# Patient Record
Sex: MALE | Race: WHITE | NOT HISPANIC OR LATINO | Employment: UNEMPLOYED | URBAN - METROPOLITAN AREA
[De-identification: names, ages, dates, MRNs, and addresses within clinical notes are randomized per-mention and may not be internally consistent; named-entity substitution may affect disease eponyms.]

---

## 2020-07-06 ENCOUNTER — OFFICE VISIT CONVERTED (OUTPATIENT)
Dept: SURGERY | Facility: CLINIC | Age: 55
End: 2020-07-06
Attending: SURGERY

## 2020-08-03 ENCOUNTER — OFFICE VISIT CONVERTED (OUTPATIENT)
Dept: SURGERY | Facility: CLINIC | Age: 55
End: 2020-08-03
Attending: SURGERY

## 2020-09-07 ENCOUNTER — HOSPITAL ENCOUNTER (OUTPATIENT)
Dept: PREADMISSION TESTING | Facility: HOSPITAL | Age: 55
Discharge: HOME | End: 2020-09-07
Attending: SURGERY

## 2020-09-07 LAB — SARS-COV-2 RNA SPEC QL NAA+PROBE: NOT DETECTED

## 2020-09-11 ENCOUNTER — HOSPITAL ENCOUNTER (OUTPATIENT)
Dept: PERIOP | Facility: HOSPITAL | Age: 55
Setting detail: HOSPITAL OUTPATIENT SURGERY
Discharge: HOME | End: 2020-09-11
Attending: SURGERY

## 2020-09-11 LAB
GLUCOSE BLD-MCNC: 189 MG/DL (ref 70–99)
GLUCOSE BLD-MCNC: 254 MG/DL (ref 70–99)

## 2020-09-21 ENCOUNTER — OFFICE VISIT CONVERTED (OUTPATIENT)
Dept: SURGERY | Facility: CLINIC | Age: 55
End: 2020-09-21
Attending: SURGERY

## 2020-09-28 ENCOUNTER — CONVERSION ENCOUNTER (OUTPATIENT)
Dept: SURGERY | Facility: CLINIC | Age: 55
End: 2020-09-28

## 2020-09-28 ENCOUNTER — OFFICE VISIT CONVERTED (OUTPATIENT)
Dept: SURGERY | Facility: CLINIC | Age: 55
End: 2020-09-28
Attending: SURGERY

## 2020-10-13 ENCOUNTER — OFFICE VISIT CONVERTED (OUTPATIENT)
Dept: SURGERY | Facility: CLINIC | Age: 55
End: 2020-10-13
Attending: SURGERY

## 2020-12-14 ENCOUNTER — OFFICE VISIT CONVERTED (OUTPATIENT)
Dept: SURGERY | Facility: CLINIC | Age: 55
End: 2020-12-14
Attending: SURGERY

## 2021-05-10 NOTE — H&P
History and Physical      Patient Name: Winston Nazario   Patient ID: [id number]   Sex: Male   YOB: 1965    Primary Care Provider: Lou FORD   Referring Provider: Lou FORD    Visit Date: July 6, 2020    Provider: Luca Garcia MD   [facility]   Location Address: [address] [place] [address]  [id number]   Location Phone: [phone]          Chief Complaint  · Pilonidal Cyst      History Of Present Illness  Winston Nazario is a 54 year old /White male who presents to the office today as a consult from Lou FORD.      Patient was referred by Lou Owens who is with the [facility] in [place] for ongoing pilonidal issues.  The patient says he had a pilonidal cystectomy by Dr. Chucky Huntley in January 2020 but shortly after that the pilonidal cyst recurred and the patient is adamant that he not see Dr. Huntley again.  Patient has an open wound in the midline of his buttocks and he has had problems on and off with open wounds and drainage from this area for many years.  Patient does not smoke cigarettes.       Past Medical History  Disease Name Date Onset Notes   Arthritis --  --    Bowel Disease --  --    Chronic Obstructive Pulmonary Disease --  --    Diabetes --  --    High blood pressure --  --    High cholesterol --  --    Kidney stones --  --    Shortness Of Air --  --          Past Surgical History  Procedure Name Date Notes   Appendectomy --  --          Medication List  Name Date Started Instructions   atorvastatin 20 mg oral tablet  --    lisinopril 5 mg oral tablet  --    montelukast 10 mg oral tablet  take 1 tablet (10 mg) by oral route once daily in the evening   naproxen 500 mg oral tablet  --    Trelegy Ellipta 100-62.5-25 mcg inhalation blister with device  inhale 1 puff by inhalation route once daily at the same time each day         Allergy List  Allergen Name Date Reaction Notes   NO KNOWN DRUG  "ALLERGIES --  --  --        Allergies Reconciled  Social History  Finding Status Start/Stop Quantity Notes   Tobacco Never --/-- --  --          Review of Systems  · Constitutional  o Denies  o : chills, fever  · Gastrointestinal  o Denies  o : nausea, vomiting      Vitals  Date Time BP Position Site L\R Cuff Size HR RR TEMP (F) WT  HT  BMI kg/m2 BSA m2 O2 Sat HC       07/06/2020 01:40 PM       12  281lbs 8oz 5'  10\" 40.39 2.51           Physical Examination  · Constitutional  o Appearance  o : here alone, alert and in no acute distress, reliable historian  · Head and Face  o Head  o :   § Inspection  § : no visable deformities or lesions  · Eyes  o Conjunctivae  o : clear  o Sclerae  o : clear  · Neck  o Inspection/Palpation  o : normal appearance, no masses, trachea midline  · Respiratory  o Respiratory Effort  o : breathing unlabored, respiratory effort appears normal  o Inspection of Chest  o : normal appearance, no retractions  · Cardiovascular  o Heart  o : regular rate and rhythm  · Gastrointestinal  o Abdominal Examination  o :   § Abdomen  § : soft, nontender, nondistended  · Skin and Subcutaneous Tissue  o General Inspection  o : There is significant scar tissue in the midline gluteal cleft. There is an open wound about 3 cm long by 1.5 cm wide with beefy red tissue at the base. There are several skin pits in the midline gluteal cleft. No drainage today. No erythema. No fluctuance.  · Neurologic  o Cranial Nerves  o : no obvious motor deficits  o Sensation  o : no obvious sensory deficits  o Gait and Station  o :   § Gait Screening  § : normal gait, able to stand without diffculty  o Cerebellar Function  o : no obvious abnormalities  · Psychiatric  o Judgement and Insight  o : judgment and insight intact  o Mood and Affect  o : mood normal, affect appropriate          Assessment  · Pilonidal disease     709.8/L98.8    Problems Reconciled  Plan  · Medications  o Medications have been " Reconciled  o Transition of Care or Provider Policy  · Instructions  o Electronically Identified Patient Education Materials Provided Electronically     We discussed surgical option for the patient's significant pilonidal disease.  My recommendation is a cleft lift procedure.  I specifically perform a [place] cleft lift procedure.  I told the patient to review the procedure on the Internet and then schedule an appointment to see me sometime in August and we will make plans for a surgery date.             Electronically Signed by: Luca Garcia MD -Author on July 6, 2020 02:00:20 PM

## 2021-05-13 NOTE — PROGRESS NOTES
"   Progress Note      Patient Name: Winston Nazario   Patient ID: [id number]   Sex: Male   YOB: 1965    Primary Care Provider: Lou FORD   Referring Provider: Lou FORD    Visit Date: September 21, 2020    Provider: Luca Garcia MD   Location: [facility]   Location Address: [address] [place] [address]  [id number]   Location Phone: [phone]          Chief Complaint  · Follow up Surgery      History Of Present Illness  Winston Nazario is a 55 year old /White male who presents today for a postoperative visit.      Patient is here for follow-up after excision of pilonidal disease with a cleft lift procedure performed 10 days ago.  He has no complaints and is doing well.  Drain has had minimal output and the drain was removed today without difficulty.  Fortunately, the wound is healing very well thus far.  No new issues to address.  I will have the patient see me in one week so I can remove the sutures.       Vitals  Date Time BP Position Site L\R Cuff Size HR RR TEMP (F) WT  HT  BMI kg/m2 BSA m2 O2 Sat HC       09/21/2020 08:38 AM       14  283lbs 0oz 5'  10\" 40.61 2.52               Assessment  · Postoperative Exam Following Surgery     V67.00      Plan  · Medications  o Medications have been Reconciled  o Transition of Care or Provider Policy  · Instructions  o See Above HPI section.  o Electronically Identified Patient Education Materials Provided Electronically            Electronically Signed by: Luca Garcia MD -Author on September 21, 2020 08:55:51 AM  "

## 2021-05-13 NOTE — PROGRESS NOTES
"   Progress Note      Patient Name: Winston Nazario   Patient ID: [id number]   Sex: Male   YOB: 1965    Primary Care Provider: Lou FORD   Referring Provider: Lou FORD    Visit Date: September 28, 2020    Provider: Luca Garcia MD   Location: [facility]   Location Address: [address] [place] [address]  [id number]   Location Phone: [phone]          Chief Complaint  · Follow up Surgery      History Of Present Illness  Winston Nazario is a 55 year old /White male who presents today for a postoperative visit.      Patient is here for another follow-up appointment after excision of pilonidal disease with a cleft lift procedure.  He continues to do well.  On exam, the incision is still healing without any evidence of infection.  The area where the drain was located has scabbed over.  There is some mild swelling along the right side of the incision consistent with a small seroma but nothing large enough that requires drainage at this time.  I removed all of the visible Prolene suture but patient has stool around his anus and because of this I am unable to see the lower aspect of the incision very well to tell if I have removed the sutures at the lower aspect.  Nevertheless, I have the patient see me again in 2 weeks for another evaluation.  I told him he needs to get in the shower 2 times a day and clean over the wound with antibacterial soap during each time he showers.       Vitals  Date Time BP Position Site L\R Cuff Size HR RR TEMP (F) WT  HT  BMI kg/m2 BSA m2 O2 Sat HC       09/28/2020 01:07 PM       14  274lbs 6oz 5'  10\" 39.37 2.48               Assessment  · Postoperative Exam Following Surgery     V67.00      Plan  · Medications  o Medications have been Reconciled  o Transition of Care or Provider Policy  · Instructions  o See Above HPI section.  o Electronically Identified Patient Education Materials Provided " Electronically            Electronically Signed by: Luca Garcia MD -Author on September 28, 2020 01:25:33 PM

## 2021-05-13 NOTE — PROGRESS NOTES
"   Progress Note      Patient Name: Winston Nazario   Patient ID: [id number]   Sex: Male   YOB: 1965    Primary Care Provider: Lou FORD   Referring Provider: Lou FORD    Visit Date: December 14, 2020    Provider: Luca Garcia MD   Location: [facility]   Location Address: [address] [place] [address]  [id number]   Location Phone: [phone]          Chief Complaint  · Follow up Surgery      History Of Present Illness  Winston Nazario is a 55 year old /White male who presents today for a postoperative visit.      Patient is here for another follow-up appointment after excision of his pilonidal disease.  He continues to do great.  No concerns reported by the patient.  The surgical site has completely healed and looks fine.  My assessment is the patient has fully recovered after his surgery.  There are no new issues to address and the patient can see me PRN going forward.       Past Medical History  Arthritis; Bowel Disease; Chronic Obstructive Pulmonary Disease; Diabetes; High blood pressure; High cholesterol; Kidney stones; Shortness Of Air         Past Surgical History  Appendectomy; Pilonidal Cyst Excision         Medication List  atorvastatin 20 mg oral tablet; lisinopril 5 mg oral tablet; montelukast 10 mg oral tablet; naproxen 500 mg oral tablet; Trelegy Ellipta 100-62.5-25 mcg inhalation blister with device         Allergy List  NO KNOWN DRUG ALLERGIES         Social History  Tobacco (Never)         Vitals  Date Time BP Position Site L\R Cuff Size HR RR TEMP (F) WT  HT  BMI kg/m2 BSA m2 O2 Sat FR L/min FiO2        12/14/2020 08:48 AM       16  287lbs 4oz 5'  10\" 41.22 2.54                 Assessment  · Postoperative Exam Following Surgery     V67.00      Plan  · Medications  o Medications have been Reconciled  o Transition of Care or Provider Policy  · Instructions  o See Above HPI section.  o Electronically Identified Patient Education " Materials Provided Electronically  · Referrals  o ID: [id number] Date: 09/17/2020 Type: Inbound  Specialty: General Surgery            Electronically Signed by: Luca Garcia MD -Author on December 14, 2020 08:55:06 AM

## 2021-05-13 NOTE — PROGRESS NOTES
"   Progress Note      Patient Name: Winston Nazario   Patient ID: [id number]   Sex: Male   YOB: 1965    Primary Care Provider: Lou FORD   Referring Provider: Lou FORD    Visit Date: October 13, 2020    Provider: Luca Garcia MD   Location: [facility]   Location Address: [address] [place] [address]  [id number]   Location Phone: [phone]          Chief Complaint  · Follow up Surgery      History Of Present Illness  Winston Nazario is a 55 year old /White male who presents today for a postoperative visit.      Patient is here for another follow-up appointment after excision of pilonidal disease with a cleft lift procedure.  I was little bit concerned about his wound when I last saw him but fortunately the wound now looks great and there is no evidence of any infection or any problems.  Patient has no complaints.  I will have him see me again in about 2 months for another evaluation.       Vitals  Date Time BP Position Site L\R Cuff Size HR RR TEMP (F) WT  HT  BMI kg/m2 BSA m2 O2 Sat FR L/min FiO2 HC       10/13/2020 10:51 AM       14  273lbs 0oz 5'  10\" 39.17 2.47                 Assessment  · Postoperative Exam Following Surgery     V67.00      Plan  · Medications  o Medications have been Reconciled  o Transition of Care or Provider Policy  · Instructions  o See Above HPI section.  o Electronically Identified Patient Education Materials Provided Electronically            Electronically Signed by: Luca Garcia MD -Author on October 13, 2020 12:09:03 PM  "

## 2021-05-13 NOTE — PROGRESS NOTES
Progress Note      Patient Name: Winston Nazario   Patient ID: [id number]   Sex: Male   YOB: 1965    Primary Care Provider: Lou FORD   Referring Provider: Lou FORD    Visit Date: August 3, 2020    Provider: Luca Garcia MD   Location[facility]   Location Address: [address] [place] [address]  [id number]   Location Phone: [phone]          Chief Complaint  · Pilonidal Cyst      History Of Present Illness  Winston Nazario is a 54 year old /White male who presents to the office today as a consult from Lou FORD.      Previously seen by me on 7/6/2020.  He has fairly extensive disease.  We talked about surgical option and the patient went home and thought about everything and also reviewed the Internet the procedure I told him I would perform.  Patient decided he wants to proceed with surgery and is here to schedule the surgery.  A copy and paste of the HPI section and plan section from my office visit on 7/6/2020 are available below.  There have been no changes.    Copy and Paste of HPI Section from Office Visit on 7/6/20  Patient was referred by Lou Owens who is with the [facility] in [place] for ongoing pilonidal issues.  The patient says he had a pilonidal cystectomy by Dr. Chucky Huntley in January 2020 but shortly after that the pilonidal cyst recurred and the patient is adamant that he not see Dr. Huntley again.  Patient has an open wound in the midline of his buttocks and he has had problems on and off with open wounds and drainage from this area for many years.  Patient does not smoke cigarettes.    Copy and Paste of Plan Section from Office Visit on 7/6/20  We discussed surgical option for the patient's significant pilonidal disease.  My recommendation is a cleft lift procedure.  I specifically perform a [place] cleft lift procedure.  I told the patient to review the procedure on the Internet and then schedule  "an appointment to see me sometime in August and we will make plans for a surgery date.       Past Medical History  Disease Name Date Onset Notes   Arthritis --  --    Bowel Disease --  --    Chronic Obstructive Pulmonary Disease --  --    Diabetes --  --    High blood pressure --  --    High cholesterol --  --    Kidney stones --  --    Shortness Of Air --  --          Past Surgical History  Procedure Name Date Notes   Appendectomy --  --          Medication List  Name Date Started Instructions   atorvastatin 20 mg oral tablet  --    lisinopril 5 mg oral tablet  --    montelukast 10 mg oral tablet  take 1 tablet (10 mg) by oral route once daily in the evening   naproxen 500 mg oral tablet  --    Trelegy Ellipta 100-62.5-25 mcg inhalation blister with device  inhale 1 puff by inhalation route once daily at the same time each day         Allergy List  Allergen Name Date Reaction Notes   NO KNOWN DRUG ALLERGIES --  --  --          Social History  Finding Status Start/Stop Quantity Notes   Tobacco Never --/-- --  --          Review of Systems  · Constitutional  o Denies  o : chills, fever  · Gastrointestinal  o Denies  o : nausea, vomiting      Vitals  Date Time BP Position Site L\R Cuff Size HR RR TEMP (F) WT  HT  BMI kg/m2 BSA m2 O2 Sat HC       07/06/2020 01:40 PM       12  281lbs 8oz 5'  10\" 40.39 2.51     08/03/2020 08:56 AM       16  280lbs 0oz 5'  10\" 40.18 2.5           Physical Examination  · Constitutional  o Appearance  o : here alone, alert and in no acute distress, reliable historian  · Head and Face  o Head  o :   § Inspection  § : no visable deformities or lesions  · Eyes  o Conjunctivae  o : clear  o Sclerae  o : clear  · Neck  o Inspection/Palpation  o : normal appearance, no masses, trachea midline  · Respiratory  o Respiratory Effort  o : breathing unlabored, respiratory effort appears normal  o Inspection of Chest  o : normal appearance, no retractions  · Cardiovascular  o Heart  o : regular rate and " rhythm  · Gastrointestinal  o Abdominal Examination  o :   § Abdomen  § : soft, nontender, nondistended  · Skin and Subcutaneous Tissue  o General Inspection  o : There is significant scar tissue in the midline gluteal cleft and also along the right and left sides. There is an open wound about 3 cm long by 1.5 cm wide with beefy red tissue at the base. There are several skin pits in the midline gluteal cleft. No drainage today. No erythema. No fluctuance.  · Neurologic  o Cranial Nerves  o : no obvious motor deficits  o Sensation  o : no obvious sensory deficits  o Gait and Station  o :   § Gait Screening  § : normal gait, able to stand without diffculty  o Cerebellar Function  o : no obvious abnormalities  · Psychiatric  o Judgement and Insight  o : judgment and insight intact  o Mood and Affect  o : mood normal, affect appropriate          Assessment  · Pre-Surgical Orders     V72.84  · Pilonidal disease     709.8/L98.8  · Preop testing     V72.84/Z01.818      Plan  · Orders  o GENERAL SURGERY (GNSUR) - V72.84 - 09/11/2020  o [facility] Pre-Op Covid-19 Screening (53825) - - 09/07/2020   at 930am  · Medications  o Medications have been Reconciled  o Transition of Care or Provider Policy  · Instructions  o PLAN: Excision of pilonidal disease  o PLEASE SIGN PERMIT FOR: Excision of pilonidal disease  o Anesthesia: General   o Outpatient  o O.R. PREP: Per protocol  o IV: Per Anesthesia  o IV: LR@ 30ml/hr  o SCD's preoperatively  o *__Cefazolin 2 gram IV on call to OR.  o The indications, options, risks, benefits, and expected outcomes of the planned procedure were discussed with the patient and the patient agrees to proceed.   o Surgical Facility: [facility]                              Electronically Signed by: Luca Garcia MD -Author on August 3, 2020 09:29:46 AM

## 2021-05-14 VITALS — WEIGHT: 274.37 LBS | RESPIRATION RATE: 14 BRPM | BODY MASS INDEX: 39.28 KG/M2 | HEIGHT: 70 IN

## 2021-05-14 VITALS — HEIGHT: 70 IN | RESPIRATION RATE: 16 BRPM | WEIGHT: 287.25 LBS | BODY MASS INDEX: 41.12 KG/M2

## 2021-05-14 VITALS — HEIGHT: 70 IN | BODY MASS INDEX: 40.52 KG/M2 | RESPIRATION RATE: 14 BRPM | WEIGHT: 283 LBS

## 2021-05-14 VITALS — WEIGHT: 273 LBS | RESPIRATION RATE: 14 BRPM | HEIGHT: 70 IN | BODY MASS INDEX: 39.08 KG/M2

## 2021-05-15 VITALS — RESPIRATION RATE: 16 BRPM | BODY MASS INDEX: 40.09 KG/M2 | WEIGHT: 280 LBS | HEIGHT: 70 IN

## 2021-05-15 VITALS — BODY MASS INDEX: 40.3 KG/M2 | WEIGHT: 281.5 LBS | RESPIRATION RATE: 12 BRPM | HEIGHT: 70 IN

## 2021-08-23 ENCOUNTER — TELEPHONE (OUTPATIENT)
Dept: UROLOGY | Facility: CLINIC | Age: 56
End: 2021-08-23

## 2021-08-23 NOTE — TELEPHONE ENCOUNTER
Dr. Espinosa reviewed CT/US and then I called Carolina at Pamela's office. Notified them that  and  reviewed this patient's scans. They both agree that patient needs to be referred to  due to possibility of being a liposarcoma. I gave Carolina Garcia's contact information and she will discuss with Pamela to arrange that appointment.

## 2021-08-23 NOTE — TELEPHONE ENCOUNTER
CT and US at [facility]. Large scrotal mass. Right testicle pain.  Sharp stabbing pains in the area. Swollen 3 days prior being seen there on 08/11.  Sore to touch and bending painful.  Carolina sending referral, as patient has Passport.

## 2021-12-29 ENCOUNTER — TRANSCRIBE ORDERS (OUTPATIENT)
Dept: ADMINISTRATIVE | Facility: HOSPITAL | Age: 56
End: 2021-12-29

## 2021-12-29 DIAGNOSIS — N50.89 SCROTAL MASS: Primary | ICD-10-CM

## 2022-01-10 ENCOUNTER — HOSPITAL ENCOUNTER (OUTPATIENT)
Dept: CT IMAGING | Facility: HOSPITAL | Age: 57
Discharge: HOME | End: 2022-01-10
Admitting: NURSE PRACTITIONER

## 2022-01-10 DIAGNOSIS — N50.89 SCROTAL MASS: ICD-10-CM

## 2022-01-10 LAB — CREAT BLDA-MCNC: 0.7 MG/DL

## 2022-01-10 PROCEDURE — 74177 CT ABD & PELVIS W/CONTRAST: CPT

## 2022-01-10 PROCEDURE — 0 IOPAMIDOL PER 1 ML: Performed by: NURSE PRACTITIONER

## 2022-01-10 PROCEDURE — 82565 ASSAY OF CREATININE: CPT

## 2022-01-10 RX ADMIN — IOPAMIDOL 100 ML: 755 INJECTION, SOLUTION INTRAVENOUS at 12:43

## 2022-01-11 ENCOUNTER — APPOINTMENT (OUTPATIENT)
Dept: CT IMAGING | Facility: HOSPITAL | Age: 57
End: 2022-01-11

## 2022-01-11 ENCOUNTER — TELEPHONE (OUTPATIENT)
Dept: NEUROSURGERY | Facility: CLINIC | Age: 57
End: 2022-01-11

## 2022-01-11 ENCOUNTER — OFFICE VISIT (OUTPATIENT)
Dept: NEUROSURGERY | Facility: CLINIC | Age: 57
End: 2022-01-11

## 2022-01-11 VITALS
WEIGHT: 299 LBS | HEART RATE: 74 BPM | BODY MASS INDEX: 42.8 KG/M2 | SYSTOLIC BLOOD PRESSURE: 128 MMHG | HEIGHT: 70 IN | DIASTOLIC BLOOD PRESSURE: 81 MMHG

## 2022-01-11 DIAGNOSIS — M25.511 CHRONIC RIGHT SHOULDER PAIN: ICD-10-CM

## 2022-01-11 DIAGNOSIS — M50.30 DDD (DEGENERATIVE DISC DISEASE), CERVICAL: Primary | ICD-10-CM

## 2022-01-11 DIAGNOSIS — M54.2 CERVICALGIA: ICD-10-CM

## 2022-01-11 DIAGNOSIS — M25.511 PAIN OF RIGHT SHOULDER WITH EXTERNAL ROTATION: ICD-10-CM

## 2022-01-11 DIAGNOSIS — G89.29 CHRONIC RIGHT SHOULDER PAIN: ICD-10-CM

## 2022-01-11 PROCEDURE — 99204 OFFICE O/P NEW MOD 45 MIN: CPT | Performed by: PHYSICIAN ASSISTANT

## 2022-01-11 NOTE — PROGRESS NOTES
"Chief Complaint  Neck Pain    Subjective          Winston Nazario who is a 56 y.o. year old male who presents to [facility] for Evaluation of the Spine.     The patient complains of pain located in the Cervical Spine.  Patients states the pain has been present for 2 years.  The pain came on gradually.  The pain scaled level is 6.  The pain does radiate. Dermatomes are located on right Cervical at: a non-specific dermatome and into the right shoulder and all the way down the arm and to all the fingers...  The pain is constant and waxing/waning and described as dull and throbbing.  The pain is worse at no particular time of day. Patient states drinking alcohol makes the pain better.  Patient states raising his right arm makes the pain worse.    Associated Symptoms Include: Numbness, Tingling and Weakness in the right arm.  Conservative Interventions Include: NSAIDs that were ineffective.    Was this the result of an injury or accident?: No    History of Previous Spinal Surgery?: No     reports that he has never smoked. He does not have any smokeless tobacco history on file.    Review of Systems   Musculoskeletal: Positive for myalgias, neck pain and neck stiffness.   Neurological: Positive for weakness and numbness.        Objective   Vital Signs:   /81   Pulse 74   Ht 177.8 cm (70\")   Wt 136 kg (299 lb)   BMI 42.90 kg/m²       Physical Exam  Constitutional:       Appearance: Normal appearance. He is obese.   Neck:      Comments: Pain with ROM  Pulmonary:      Effort: Pulmonary effort is normal.   Musculoskeletal:         General: Tenderness (right shoulder joint) present.      Comments: Timur's negative bilaterally,  Tinel's negative at wrists and elbows,  Pain in right shoulder with abduction, and internal/external rotation.   Neurological:      General: No focal deficit present.      Mental Status: He is alert and oriented to person, place, and time.      " "Sensory: No sensory deficit.      Motor: No weakness.      Deep Tendon Reflexes: Reflexes normal.   Psychiatric:         Mood and Affect: Mood normal.         Behavior: Behavior normal.        Neurologic Exam     Mental Status   Oriented to person, place, and time.        Result Review     I have personally reviewed the radiology report for CT scan of cervical spine without contrast from 11/16/2021 which shows mild congenital canal and neuroforaminal stenosis, with acquired stenosis most severe on the right at C5-C6 due to small disc bulge, and central canal stenosis at C6-C7 due to a small disc bulge.       Assessment and Plan    Diagnoses and all orders for this visit:    1. DDD (degenerative disc disease), cervical (Primary)    2. Cervicalgia    3. Chronic right shoulder pain  -     MRI Shoulder Right Without Contrast; Future    4. Pain of right shoulder with external rotation  -     MRI Shoulder Right Without Contrast; Future    His cervical changes seem mild, based on the radiology report. I'd like to see the imaging to be sure, but I don't feel that his pain is coming from the neck.    He does have significant pain in the right shoulder with abduction and internal and external rotation. Certianly, he may have an underlying shoulder pathology. He has seen Dr. Monahan in Orthopedics - who referred him to us.    He reports he has not had any x-ray or advanced imaging of the right shoulder. I do feel that this would be appropriate given his pain complaints. I will order an MRI of the right shoulder to assess for possible shoulder pathology. He reports he has a metal annabelle in his right femur - I will make a note of this in the order for the technologist, but I don't see that this should prevent him from having an MRI.    He reports that he had EMG testing ordered by Dr. Monahan which showed \"extreme compression of the radial nerve in the right arm\". We don't have the actual results for this testing, but I'd like to " review them. He had EMG done with [facility] in [place] - I will try to get the report from there.    The patient was counseled on basic recommendations for the reduction and prevention of back, neck, or spine pain in association with spinal disorders, including: cessation/avoidance of nicotine use, maintenance of a healthy BMI and weight, focusing on building/maintaining core strength through core exercise, and avoidance of activities which worsen the pain. Surgery for patients with multilevel degenerative disc disease is not typically successful, with the risks outweighing the benefit. The patient will monitor for changes in symptoms and notify our clinic of these changes as needed.    He will follow-up here in about 6 weeks to reassess and review imaging.      Follow Up   Return in about 6 weeks (around 2/22/2022).  Patient was given instructions and counseling regarding his condition or for health maintenance advice. Please see specific information pulled into the AVS if appropriate.

## 2022-01-11 NOTE — TELEPHONE ENCOUNTER
Called and spoke to [facility] a second time to request Ji MRI be power shared to us ASAP. Radiology stated they would be sending it within 5 minutes.

## 2022-01-20 ENCOUNTER — TRANSCRIBE ORDERS (OUTPATIENT)
Dept: ADMINISTRATIVE | Facility: HOSPITAL | Age: 57
End: 2022-01-20

## 2022-01-20 DIAGNOSIS — K76.9 LIVER LESION: Primary | ICD-10-CM

## 2022-01-31 ENCOUNTER — APPOINTMENT (OUTPATIENT)
Dept: MRI IMAGING | Facility: HOSPITAL | Age: 57
End: 2022-01-31

## 2022-02-15 ENCOUNTER — HOSPITAL ENCOUNTER (OUTPATIENT)
Dept: MRI IMAGING | Facility: HOSPITAL | Age: 57
Discharge: HOME | End: 2022-02-15

## 2022-02-15 ENCOUNTER — TELEPHONE (OUTPATIENT)
Dept: NEUROSURGERY | Facility: CLINIC | Age: 57
End: 2022-02-15

## 2022-02-15 DIAGNOSIS — G89.29 CHRONIC RIGHT SHOULDER PAIN: ICD-10-CM

## 2022-02-15 DIAGNOSIS — M25.511 PAIN OF RIGHT SHOULDER WITH EXTERNAL ROTATION: ICD-10-CM

## 2022-02-15 DIAGNOSIS — K76.9 LIVER LESION: ICD-10-CM

## 2022-02-15 DIAGNOSIS — M25.511 CHRONIC RIGHT SHOULDER PAIN: ICD-10-CM

## 2022-02-17 NOTE — TELEPHONE ENCOUNTER
Patient is scheduled @ [facility] 02/24/22 @ 5:00 pm and changed follow up to 03/08/22 @ 9:30 am

## 2022-02-28 ENCOUNTER — TELEPHONE (OUTPATIENT)
Dept: NEUROSURGERY | Facility: CLINIC | Age: 57
End: 2022-02-28

## 2022-03-04 ENCOUNTER — TRANSCRIBE ORDERS (OUTPATIENT)
Dept: ADMINISTRATIVE | Facility: HOSPITAL | Age: 57
End: 2022-03-04

## 2022-03-04 DIAGNOSIS — R16.0 LIVER MASS: Primary | ICD-10-CM

## 2022-03-08 ENCOUNTER — OFFICE VISIT (OUTPATIENT)
Dept: NEUROSURGERY | Facility: CLINIC | Age: 57
End: 2022-03-08

## 2022-03-08 VITALS
HEIGHT: 70 IN | WEIGHT: 299 LBS | DIASTOLIC BLOOD PRESSURE: 80 MMHG | BODY MASS INDEX: 42.8 KG/M2 | SYSTOLIC BLOOD PRESSURE: 136 MMHG | HEART RATE: 74 BPM

## 2022-03-08 DIAGNOSIS — M50.30 DDD (DEGENERATIVE DISC DISEASE), CERVICAL: Primary | ICD-10-CM

## 2022-03-08 DIAGNOSIS — M25.511 CHRONIC RIGHT SHOULDER PAIN: ICD-10-CM

## 2022-03-08 DIAGNOSIS — M54.2 CERVICALGIA: ICD-10-CM

## 2022-03-08 DIAGNOSIS — R20.2 POSITIVE TINEL'S SIGN: ICD-10-CM

## 2022-03-08 DIAGNOSIS — G89.29 CHRONIC RIGHT SHOULDER PAIN: ICD-10-CM

## 2022-03-08 PROCEDURE — 99214 OFFICE O/P EST MOD 30 MIN: CPT | Performed by: PHYSICIAN ASSISTANT

## 2022-03-08 NOTE — PROGRESS NOTES
Patient being seen for today for Back Pain  .    Subjective    Winston Nazario is a 56 y.o. male that presents with Back Pain  .    HPI  Previously: Last seen on 1/11/2022 for neck pain extending into the right shoulder and all the way to the fingers on the right hand, based on radiology report he had mild cervical changes on his CT of cervical spine, he did bring his disc today, he had significant pain in the right shoulder with abduction and internal and external rotation, there is an order for MRI of the right shoulder without contrast to evaluate further.    Today: His pain is about the same today, only the shoulder pain on the right is somewhat worse. He did go to complete the MRI of the right shoulder, but was unable to complete the image due to an anxiety attack during the procedure. Otherwise, he denies new complaints.     reports that he has never smoked. He has never used smokeless tobacco.    Review of Systems   Musculoskeletal: Positive for arthralgias (right shoulder), neck pain and neck stiffness.   Neurological: Positive for weakness and numbness.       Objective   Vitals:    03/08/22 0932   BP: 136/80   Pulse: 74        Physical Exam  Constitutional:       Appearance: Normal appearance. He is obese.   Neck:      Comments: Pain with ROM to the left  Pulmonary:      Effort: Pulmonary effort is normal.   Musculoskeletal:         General: Tenderness (right shoulder joint, right trapezius muscle) present.      Comments: Timur's negative bilaterally,  Tinel's positive at right elbow.   Neurological:      General: No focal deficit present.      Mental Status: He is alert and oriented to person, place, and time.      Sensory: No sensory deficit.      Motor: No weakness.      Deep Tendon Reflexes: Reflexes normal.   Psychiatric:         Mood and Affect: Mood normal.         Behavior: Behavior normal.          Result Review   I have personally reviewed the CT scan of cervical spine without contrast from  11/16/2021 which shows mild congenital canal and neuroforaminal stenosis, with acquired stenosis most severe on the right at C5-C6 due to small disc bulge, and central canal stenosis at C6-C7 due to a small disc bulge.     Assessment and Plan {CC Problem List  Visit Diagnosis  ROS  Review (Popup)  [facility]  Quality  BestPractice  Medications  SmartSets  SnapShot Encounters  Media :23}   Diagnoses and all orders for this visit:    1. DDD (degenerative disc disease), cervical (Primary)    2. Cervicalgia    3. Chronic right shoulder pain    4. Positive Tinel's sign    We certainly could consider re-ordering the MRI of the shoulder and I could send an Rx for Ativan for him to take just prior to his procedure. He is not sure he wants to go through the process of getting an MRI again at this point.    I would consider ordering new NCV/EMG testing of the upper extremities to evaluate for possible ulnar nerve impingement, as he does have positive tinel's testing at the right elbow. I will order this testing today.    He does have some degenerative changes and narrowing in the cervical spine, although it appears relatively mild on the CT imaging. He could consider CESB to assess benefit for his pain. I will refer him today to [facility] in [place].    He may also consider physical therapy to assess benefit for his pain. He would like to defer this for now.    The patient was counseled on basic recommendations for the reduction and prevention of back, neck, or spine pain in association with spinal disorders, including: cessation/avoidance of nicotine use, maintenance of a healthy BMI and weight, focusing on building/maintaining core strength through core exercise, and avoidance of activities which worsen the pain. The patient will monitor for changes in symptoms and notify our clinic of these changes as needed.    He will follow-up here in about 8 weeks.    Follow Up {Instructions Charge Capture  Follow-up  Communications :23}   Return in about 8 weeks (around 5/3/2022).

## 2022-03-10 ENCOUNTER — APPOINTMENT (OUTPATIENT)
Dept: CT IMAGING | Facility: HOSPITAL | Age: 57
End: 2022-03-10

## 2022-03-15 ENCOUNTER — HOSPITAL ENCOUNTER (OUTPATIENT)
Dept: CT IMAGING | Facility: HOSPITAL | Age: 57
Discharge: HOME | End: 2022-03-15
Admitting: SURGERY

## 2022-03-15 DIAGNOSIS — R16.0 LIVER MASS: ICD-10-CM

## 2022-03-15 PROCEDURE — 0 IOPAMIDOL PER 1 ML: Performed by: SURGERY

## 2022-03-15 PROCEDURE — 74170 CT ABD WO CNTRST FLWD CNTRST: CPT

## 2022-03-15 RX ADMIN — IOPAMIDOL 100 ML: 755 INJECTION, SOLUTION INTRAVENOUS at 10:58

## 2022-04-19 ENCOUNTER — PROCEDURE VISIT (OUTPATIENT)
Dept: NEUROLOGY | Facility: CLINIC | Age: 57
End: 2022-04-19

## 2022-04-19 VITALS
WEIGHT: 300 LBS | HEART RATE: 78 BPM | BODY MASS INDEX: 42.95 KG/M2 | HEIGHT: 70 IN | DIASTOLIC BLOOD PRESSURE: 79 MMHG | SYSTOLIC BLOOD PRESSURE: 139 MMHG

## 2022-04-19 DIAGNOSIS — M79.601 RIGHT ARM PAIN: Primary | ICD-10-CM

## 2022-04-19 DIAGNOSIS — M50.30 DDD (DEGENERATIVE DISC DISEASE), CERVICAL: ICD-10-CM

## 2022-04-19 DIAGNOSIS — R20.2 POSITIVE TINEL'S SIGN: ICD-10-CM

## 2022-04-19 PROCEDURE — 95886 MUSC TEST DONE W/N TEST COMP: CPT | Performed by: PSYCHIATRY & NEUROLOGY

## 2022-04-19 PROCEDURE — 95909 NRV CNDJ TST 5-6 STUDIES: CPT | Performed by: PSYCHIATRY & NEUROLOGY

## 2022-04-19 PROCEDURE — 99202 OFFICE O/P NEW SF 15 MIN: CPT | Performed by: PSYCHIATRY & NEUROLOGY

## 2022-04-19 RX ORDER — GABAPENTIN 300 MG/1
CAPSULE ORAL EVERY 6 HOURS SCHEDULED
COMMUNITY

## 2022-04-19 NOTE — PROGRESS NOTES
"Chief Complaint  Numbness (RUE), Pain (RUE), and Extremity Weakness (RUE)    Subjective          Winston Nazario is a 56 y.o. male who presents to [facility]  History of Present Illness  56-year-old man evaluated for right arm pain.  He states that he has been having pain for the last 3 to 4 years.  It is in his right shoulder, right elbow medially and laterally, wrist.  This is painful all the time.  It hurts to .  It hurts to move his arm.  He is here today for EMG nerve conduction study.  Objective   Vital Signs:   /79   Pulse 78   Ht 177.8 cm (70\")   Wt 136 kg (300 lb)   BMI 43.05 kg/m²     Physical Exam   There is no weakness of his upper extremity and vision muscle testing.  There is tremor noted with full effort.  Range of motion of the right shoulder is significantly limited.        Assessment and Plan  Diagnoses and all orders for this visit:    1. Right arm pain (Primary)  Assessment & Plan:  EMG and nerve conduction study of the right upper extremity is normal and does not show electrophysiologic evidence for cervical radiculopathy involving the C5-T1 ventral nerve roots.  There is no evidence of denervation or reinnervation in the muscles tested.  Nerve conduction study is normal and does not show electrophysiologic evidence for ulnar neuropathy at the elbow or median neuropathy at the wrist.      2. DDD (degenerative disc disease), cervical  -     EMG & Nerve Conduction Test    3. Positive Tinel's sign  -     EMG & Nerve Conduction Test       Nerve Conduction Study:  5 nerves     EMG:  Complete    Total time spent with the patient and coordinating patient care was 15 minutes.    Follow Up  No follow-ups on file.  Patient was given instructions and counseling regarding his condition or for health maintenance advice. Please see specific information pulled into the AVS if appropriate.       "

## 2022-04-19 NOTE — ASSESSMENT & PLAN NOTE
EMG and nerve conduction study of the right upper extremity is normal and does not show electrophysiologic evidence for cervical radiculopathy involving the C5-T1 ventral nerve roots.  There is no evidence of denervation or reinnervation in the muscles tested.  Nerve conduction study is normal and does not show electrophysiologic evidence for ulnar neuropathy at the elbow or median neuropathy at the wrist.

## 2022-05-03 ENCOUNTER — OFFICE VISIT (OUTPATIENT)
Dept: NEUROSURGERY | Facility: CLINIC | Age: 57
End: 2022-05-03

## 2022-05-03 VITALS
HEART RATE: 98 BPM | SYSTOLIC BLOOD PRESSURE: 142 MMHG | DIASTOLIC BLOOD PRESSURE: 90 MMHG | HEIGHT: 70 IN | BODY MASS INDEX: 42.48 KG/M2 | WEIGHT: 296.7 LBS

## 2022-05-03 DIAGNOSIS — M50.30 DDD (DEGENERATIVE DISC DISEASE), CERVICAL: Primary | ICD-10-CM

## 2022-05-03 DIAGNOSIS — M54.2 CERVICALGIA: ICD-10-CM

## 2022-05-03 PROCEDURE — 99213 OFFICE O/P EST LOW 20 MIN: CPT | Performed by: PHYSICIAN ASSISTANT

## 2022-05-03 RX ORDER — DAPAGLIFLOZIN AND METFORMIN HYDROCHLORIDE 10; 1000 MG/1; MG/1
1 TABLET, FILM COATED, EXTENDED RELEASE ORAL
COMMUNITY

## 2022-05-03 NOTE — PROGRESS NOTES
Patient being seen for today for Back Pain, Neck Pain, Numbness, and Tingling  .    Subjective    Winston Nazario is a 56 y.o. male that presents with Back Pain, Neck Pain, Numbness, and Tingling  .    HPI  Previously: Last seen on 3/8/2022 for neck pain extending in the right shoulder and down the right arm to all the fingers of the right hand he had mild cervical changes on the CT scan, there is an order for right shoulder MRI which he was unable to complete due to anxiety attack, the last visit there was an order for NCV/EMG testing to evaluate the bilateral upper extremities positive Tinel's testing at the right elbow, he was also referred to pain management to consider cervical epidural steroid blocks.    Today: He has had #1 and #2 CESB with [facility], he does feel this has helped somewhat, as his pain is better unless he moves his head a certain way.     reports that he has never smoked. He has never used smokeless tobacco.    Review of Systems   Musculoskeletal: Positive for neck pain.   Neurological: Positive for weakness and numbness.       Objective   Vitals:    05/03/22 1438   BP: 142/90   Pulse: 98        Physical Exam  Constitutional:       Appearance: Normal appearance. He is obese.   Neck:      Comments: Pain with ROM  Pulmonary:      Effort: Pulmonary effort is normal.   Musculoskeletal:         General: Tenderness (right shoulder joint, midline cervical spine) present.      Comments: Timur's negative bilaterally,  Pain in the right shoulder with abduction.   Neurological:      General: No focal deficit present.      Mental Status: He is alert and oriented to person, place, and time.      Sensory: No sensory deficit.      Motor: No weakness.      Deep Tendon Reflexes: Reflexes normal.   Psychiatric:         Mood and Affect: Mood normal.         Behavior: Behavior normal.          Result Review   I have personally reviewed the NCV/EMG report from 4/19/2022 which shows normal EMG and nerve conduction  study.       Assessment and Plan {CC Problem List  Visit Diagnosis  ROS  Review (Popup)  [facility]  Quality  BestPractice  Medications  SmartSets  SnapShot Encounters  Media :23}   Diagnoses and all orders for this visit:    1. DDD (degenerative disc disease), cervical (Primary)    2. Cervicalgia    He does continue to have pain in the neck and down the right arm. Again, he had mild changes on the cervical CT. I'm not convinced that surgery would be helpful.    He has seen some benefit from the #1 and #2 CESB. I would have him continue these if possible.    He is continuing physical therapy and will be starting cervical traction soon. I would have him continue.    The nerve conduction testing was normal.    For the right shoulder pain, I do not expect that this is related to his cervical spine. He could not tolerate the MRI of the right shoulder due to his anxiety. I would be happy to re-order, but he does not feel he would tolerate. He is going to discuss further with his PCP and consider a referral to another orthopedist for a 2nd opinion on the shoulder.    The patient was counseled on basic recommendations for the reduction and prevention of back, neck, or spine pain in association with spinal disorders, including: cessation/avoidance of nicotine use, maintenance of a healthy BMI and weight, focusing on building/maintaining core strength through core exercise, and avoidance of activities which worsen the pain. The patient will monitor for changes in symptoms and notify our clinic of these changes as needed.    He will follow-up here PRN for failure to improve or worsening symptoms.  Follow Up {Instructions Charge Capture  Follow-up Communications :23}   Return if symptoms worsen or fail to improve.

## 2022-09-01 ENCOUNTER — TRANSCRIBE ORDERS (OUTPATIENT)
Dept: ADMINISTRATIVE | Facility: HOSPITAL | Age: 57
End: 2022-09-01

## 2022-09-01 DIAGNOSIS — C62.90 MALIGNANT NEOPLASM OF TESTICLE, UNSPECIFIED LATERALITY, UNSPECIFIED WHETHER DESCENDED OR UNDESCENDED: Primary | ICD-10-CM

## 2022-09-23 ENCOUNTER — TRANSCRIBE ORDERS (OUTPATIENT)
Dept: ADMINISTRATIVE | Facility: HOSPITAL | Age: 57
End: 2022-09-23

## 2022-09-23 DIAGNOSIS — M75.101 TEAR OF RIGHT ROTATOR CUFF, UNSPECIFIED TEAR EXTENT, UNSPECIFIED WHETHER TRAUMATIC: Primary | ICD-10-CM

## 2022-09-26 ENCOUNTER — APPOINTMENT (OUTPATIENT)
Dept: CT IMAGING | Facility: HOSPITAL | Age: 57
End: 2022-09-26

## 2022-09-29 ENCOUNTER — HOSPITAL ENCOUNTER (OUTPATIENT)
Dept: CT IMAGING | Facility: HOSPITAL | Age: 57
Discharge: HOME | End: 2022-09-29
Admitting: UROLOGY

## 2022-09-29 DIAGNOSIS — C62.90 MALIGNANT NEOPLASM OF TESTICLE, UNSPECIFIED LATERALITY, UNSPECIFIED WHETHER DESCENDED OR UNDESCENDED: ICD-10-CM

## 2022-09-29 LAB
CREAT BLDA-MCNC: 0.7 MG/DL
EGFRCR SERPLBLD CKD-EPI 2021: 108.8 ML/MIN/1.73

## 2022-09-29 PROCEDURE — 0 IOPAMIDOL PER 1 ML: Performed by: UROLOGY

## 2022-09-29 PROCEDURE — 82565 ASSAY OF CREATININE: CPT

## 2022-09-29 PROCEDURE — 74177 CT ABD & PELVIS W/CONTRAST: CPT

## 2022-09-29 RX ADMIN — IOPAMIDOL 100 ML: 755 INJECTION, SOLUTION INTRAVENOUS at 14:43

## 2022-10-19 ENCOUNTER — HOSPITAL ENCOUNTER (OUTPATIENT)
Dept: INTERVENTIONAL RADIOLOGY/VASCULAR | Facility: HOSPITAL | Age: 57
Discharge: HOME | End: 2022-10-19

## 2022-10-19 ENCOUNTER — HOSPITAL ENCOUNTER (OUTPATIENT)
Dept: CT IMAGING | Facility: HOSPITAL | Age: 57
Discharge: HOME | End: 2022-10-19

## 2022-10-19 DIAGNOSIS — M75.101 TEAR OF RIGHT ROTATOR CUFF, UNSPECIFIED TEAR EXTENT, UNSPECIFIED WHETHER TRAUMATIC: ICD-10-CM

## 2022-10-19 PROCEDURE — 73040 CONTRAST X-RAY OF SHOULDER: CPT

## 2022-10-19 PROCEDURE — 77002 NEEDLE LOCALIZATION BY XRAY: CPT

## 2022-10-19 PROCEDURE — 73201 CT UPPER EXTREMITY W/DYE: CPT

## 2022-10-19 PROCEDURE — 25010000002 IOPAMIDOL 61 % SOLUTION: Performed by: ORTHOPAEDIC SURGERY

## 2022-10-19 RX ORDER — LIDOCAINE HYDROCHLORIDE 20 MG/ML
20 INJECTION, SOLUTION INFILTRATION; PERINEURAL ONCE
Status: COMPLETED | OUTPATIENT
Start: 2022-10-19 | End: 2022-10-19

## 2022-10-19 RX ORDER — SODIUM CHLORIDE 9 MG/ML
10 INJECTION INTRAVENOUS
Status: COMPLETED | OUTPATIENT
Start: 2022-10-19 | End: 2022-10-19

## 2022-10-19 RX ADMIN — IOPAMIDOL 15 ML: 612 INJECTION, SOLUTION INTRATHECAL at 12:42

## 2022-10-19 RX ADMIN — SODIUM CHLORIDE 10 ML: 9 INJECTION, SOLUTION INTRAMUSCULAR; INTRAVENOUS; SUBCUTANEOUS at 12:42

## 2022-10-19 RX ADMIN — SODIUM BICARBONATE 1 MEQ: 84 INJECTION, SOLUTION INTRAVENOUS at 12:40

## 2022-10-19 RX ADMIN — LIDOCAINE HYDROCHLORIDE 9 ML: 20 INJECTION, SOLUTION INFILTRATION; PERINEURAL at 12:40

## 2023-03-13 ENCOUNTER — TRANSCRIBE ORDERS (OUTPATIENT)
Dept: ADMINISTRATIVE | Facility: HOSPITAL | Age: 58
End: 2023-03-13
Payer: COMMERCIAL

## 2023-03-13 DIAGNOSIS — C63.9: Primary | ICD-10-CM
